# Patient Record
Sex: MALE | Race: WHITE | URBAN - METROPOLITAN AREA
[De-identification: names, ages, dates, MRNs, and addresses within clinical notes are randomized per-mention and may not be internally consistent; named-entity substitution may affect disease eponyms.]

---

## 2022-01-17 ENCOUNTER — TELEPHONE (OUTPATIENT)
Dept: UROLOGY | Age: 37
End: 2022-01-17

## 2022-01-17 NOTE — TELEPHONE ENCOUNTER
Patient is self referring himself for a testicular lump in his genital area  and wants to make sure everything is ok

## 2022-01-21 ENCOUNTER — OFFICE VISIT (OUTPATIENT)
Dept: UROLOGY | Age: 37
End: 2022-01-21
Payer: COMMERCIAL

## 2022-01-21 VITALS — WEIGHT: 215 LBS | BODY MASS INDEX: 30.78 KG/M2 | HEIGHT: 70 IN

## 2022-01-21 DIAGNOSIS — K62.89 PERIRECTAL CYST: ICD-10-CM

## 2022-01-21 PROCEDURE — 99203 OFFICE O/P NEW LOW 30 MIN: CPT | Performed by: UROLOGY

## 2022-01-21 RX ORDER — ERGOCALCIFEROL 1.25 MG/1
50000 CAPSULE ORAL
COMMUNITY

## 2022-01-21 RX ORDER — ADALIMUMAB 40MG/0.8ML
KIT SUBCUTANEOUS ONCE
COMMUNITY

## 2022-01-21 NOTE — ASSESSMENT & PLAN NOTE
Probable perirectal cyst.  It does not appear to connect to the urinary tract. Not inflamed. Recommend observation for growth or inflammation. I would refer to general surgery if he needed intervention. He will follow with his GI as well.

## 2022-01-21 NOTE — PROGRESS NOTES
Chief Complaint   Patient presents with   [facility] Testicular Mass     1. Have you been to the ER, urgent care clinic since your last visit? Hospitalized since your last visit? No    2. Have you seen or consulted any other health care providers outside of the [address] [place] [address] since your last visit? Include any pap smears or colon screening.  No  Visit Vitals  Ht 5' 10\" (1.778 m)   Wt 215 lb (97.5 kg)   BMI 30.85 kg/m²

## 2022-01-21 NOTE — LETTER
1/21/2022    Patient: Coco Dash   YOB: 1985   Date of Visit: 1/21/2022     Lj Bray DO  [address] [phone]  Via Fax: [phone]    Dear Lj Bray DO,      Thank you for referring Mr. Coco Dash to [facility] for evaluation. My notes for this consultation are attached. If you have questions, please do not hesitate to call me. I look forward to following your patient along with you.       Sincerely,    Yanet Peck MD

## 2022-01-21 NOTE — PROGRESS NOTES
HISTORY OF PRESENT ILLNESS  Santo Styles is a 39 y.o. male. has no past medical history on file. has no past surgical history on file. Chief Complaint   Patient presents with   [facility] Testicular Mass     He has a small lump behind his testicles. It is about a pea sized and non tender. It may have regressed and returned. It started around Sep 2021. He was a little sore. No drainage. H/o Crohn's. He sees gastroenterology. Chronic Conditions Addressed Today     1. Perirectal cyst     Overview      Since 9/2021 perirectal nodule          Current Assessment & Plan      Probable perirectal cyst.  It does not appear to connect to the urinary tract. Not inflamed. Recommend observation for growth or inflammation. I would refer to general surgery if he needed intervention. He will follow with his GI as well. Past Medical History:    PMHx (including negatives):  has no past medical history on file. PSurgHx:  has no past surgical history on file. PSocHx:  reports that he has never smoked. He has never used smokeless tobacco. He reports current alcohol use. He reports that he does not use drugs. Review of Systems   All other systems reviewed and are negative. Physical Exam  Constitutional:       General: He is not in acute distress. Appearance: Normal appearance. HENT:      Head: Normocephalic and atraumatic. Eyes:      Extraocular Movements: Extraocular movements intact. Pupils: Pupils are equal, round, and reactive to light. Pulmonary:      Effort: Pulmonary effort is normal. No respiratory distress. Breath sounds: Normal breath sounds. No wheezing or rhonchi. Chest:   Breasts:      Right: No supraclavicular adenopathy. Left: No supraclavicular adenopathy. Genitourinary:     Penis: Normal. No phimosis, hypospadias or tenderness. Testes: Normal.         Right: Mass, tenderness or swelling not present. Left: Mass, tenderness or swelling not present. Epididymis:      Right: Normal. No mass or tenderness. Left: Normal. No mass or tenderness. Prostate: Enlarged. Not tender and no nodules present. Rectum: Normal.      Comments: Perirectal cyst or mass about a pea in size. Not palpably connected to the urethra or prostate  Musculoskeletal:         General: Normal range of motion. Lymphadenopathy:      Cervical: No cervical adenopathy. Upper Body:      Right upper body: No supraclavicular adenopathy. Left upper body: No supraclavicular adenopathy. Skin:     General: Skin is warm and dry. Neurological:      General: No focal deficit present. Mental Status: He is alert and oriented to person, place, and time. Psychiatric:         Mood and Affect: Mood normal.         Behavior: Behavior normal.       No Known Allergies   Prior to Admission medications    Medication Sig Start Date End Date Taking? Authorizing Provider   adalimumab (Humira) 40 mg/0.8 mL injection by SubCUTAneous route once. Yes Provider, Historical   ergocalciferol (Vitamin D2) 1,250 mcg (50,000 unit) capsule Take 50,000 Units by mouth. Yes Provider, Historical        ASSESSMENT and PLAN  Diagnoses and all orders for this visit:    1. Perirectal cyst  Assessment & Plan:  Probable perirectal cyst.  It does not appear to connect to the urinary tract. Not inflamed. Recommend observation for growth or inflammation. I would refer to general surgery if he needed intervention. He will follow with his GI as well.             Sharda Alas MD

## 2022-03-19 PROBLEM — K62.89 PERIRECTAL CYST: Status: ACTIVE | Noted: 2022-01-21

## 2025-03-27 ENCOUNTER — TELEPHONE (OUTPATIENT)
Age: 40
End: 2025-03-27

## 2025-03-27 NOTE — TELEPHONE ENCOUNTER
[facility] is calling to ask for our office to reach out to the patient to try and schedule them now that a referral has been received.

## 2025-04-10 ENCOUNTER — TELEPHONE (OUTPATIENT)
Age: 40
End: 2025-04-10

## 2025-04-10 NOTE — TELEPHONE ENCOUNTER
Mark was called 03-27 to schedule a new patient with Anisha, from a referral, he called back today, 04-10. Please call him to get an appt scheduled.

## 2025-05-09 ENCOUNTER — TELEMEDICINE (OUTPATIENT)
Age: 40
End: 2025-05-09
Payer: COMMERCIAL

## 2025-05-09 DIAGNOSIS — R41.3 MEMORY LOSS: ICD-10-CM

## 2025-05-09 DIAGNOSIS — R41.844 EXECUTIVE FUNCTION DEFICIT: ICD-10-CM

## 2025-05-09 DIAGNOSIS — Z62.9 HISTORY OF ADVERSE CHILDHOOD EXPERIENCES: ICD-10-CM

## 2025-05-09 DIAGNOSIS — R41.840 ATTENTION AND CONCENTRATION DEFICIT: Primary | ICD-10-CM

## 2025-05-09 DIAGNOSIS — R45.89 SYMPTOMS OF DEPRESSION: ICD-10-CM

## 2025-05-09 PROCEDURE — 90791 PSYCH DIAGNOSTIC EVALUATION: CPT | Performed by: PSYCHOLOGIST

## 2025-05-09 SDOH — HEALTH STABILITY - MENTAL HEALTH: PROBLEM RELATED TO UPBRINGING, UNSPECIFIED: Z62.9

## 2025-05-09 NOTE — PROGRESS NOTES
[facility]  [address] [facility], [facility], [address] [address]  Office:  [phone]  Fax: [phone]                  Initial Office Exam  Patient Name: Mark Fontaine  Age: 39 y.o.  Gender: male   Handedness: right handed   Presenting Concern: attention and concentration deficits; symptoms of depression  Primary Care Physician: Pauline Weeks DO  Referring Provider: Esme Andres APRN - *      REASON FOR REFERRAL:  This comprehensive and medically necessary neuropsychological assessment was requested to assist a differential diagnosis of cognitive and psychological concerns.  The use and purpose of this examination, as well as the extent and limitations of confidentiality, were explained prior to obtaining permission to participate.  Instructions were provided regarding the necessity to put forth optimal effort and answer questions truthfully in order to obtain reliable and accurate test results.    REVIEW OF RECORDS:  Mr. Fontaine was referred by for a work up of inattentiveness. Bupropion is prescribed.     I did not see any neuroimaging in the available records.         Current Outpatient Medications   Medication Sig Dispense Refill    adalimumab (HUMIRA) 40 MG/0.8ML injection Inject into the skin once      ergocalciferol (ERGOCALCIFEROL) 1.25 MG (91589 UT) capsule Take 1 capsule by mouth       No current facility-administered medications for this visit.          No past medical history on file.  Failed to redirect to the Timeline version of the [employer] SmartLink.    No data recorded          No data to display                 No past surgical history on file.    Social History     Socioeconomic History    Marital status:    Tobacco Use    Smoking status: Never    Smokeless tobacco: Never   Substance and Sexual Activity    Alcohol use: Yes    Drug use: Never       No family history on file.        CT Results (most recent):  CT Result (most recent):  CT ABDOMEN WO CONTRAST

## 2025-05-22 ENCOUNTER — TELEPHONE (OUTPATIENT)
Age: 40
End: 2025-05-22

## 2025-05-22 NOTE — TELEPHONE ENCOUNTER
Called patient to inform his upcoming testing appointment has been cancelled due to psychometrist ooo. GOOD requesting a call back to reschedule.

## 2025-08-07 ENCOUNTER — PROCEDURE VISIT (OUTPATIENT)
Age: 40
End: 2025-08-07
Payer: COMMERCIAL

## 2025-08-07 DIAGNOSIS — F90.2 ATTENTION DEFICIT HYPERACTIVITY DISORDER (ADHD), COMBINED TYPE: Primary | ICD-10-CM

## 2025-08-07 DIAGNOSIS — Z62.9 HISTORY OF ADVERSE CHILDHOOD EXPERIENCES: ICD-10-CM

## 2025-08-07 PROCEDURE — 96138 PSYCL/NRPSYC TECH 1ST: CPT | Performed by: PSYCHOLOGIST

## 2025-08-07 PROCEDURE — 96139 PSYCL/NRPSYC TST TECH EA: CPT | Performed by: PSYCHOLOGIST

## 2025-08-07 PROCEDURE — 96130 PSYCL TST EVAL PHYS/QHP 1ST: CPT | Performed by: PSYCHOLOGIST

## 2025-08-07 PROCEDURE — 96136 PSYCL/NRPSYC TST PHY/QHP 1ST: CPT | Performed by: PSYCHOLOGIST

## 2025-08-07 PROCEDURE — 96131 PSYCL TST EVAL PHYS/QHP EA: CPT | Performed by: PSYCHOLOGIST

## 2025-08-07 SDOH — HEALTH STABILITY - MENTAL HEALTH: PROBLEM RELATED TO UPBRINGING, UNSPECIFIED: Z62.9

## 2025-08-21 ENCOUNTER — TELEMEDICINE (OUTPATIENT)
Age: 40
End: 2025-08-21
Payer: COMMERCIAL

## 2025-08-21 DIAGNOSIS — Z62.9 HISTORY OF ADVERSE CHILDHOOD EXPERIENCES: ICD-10-CM

## 2025-08-21 DIAGNOSIS — F90.2 ATTENTION DEFICIT HYPERACTIVITY DISORDER (ADHD), COMBINED TYPE: Primary | ICD-10-CM

## 2025-08-21 PROCEDURE — 90832 PSYTX W PT 30 MINUTES: CPT | Performed by: PSYCHOLOGIST

## 2025-08-21 SDOH — HEALTH STABILITY - MENTAL HEALTH: PROBLEM RELATED TO UPBRINGING, UNSPECIFIED: Z62.9
